# Patient Record
Sex: MALE | Race: WHITE | NOT HISPANIC OR LATINO | Employment: OTHER | URBAN - METROPOLITAN AREA
[De-identification: names, ages, dates, MRNs, and addresses within clinical notes are randomized per-mention and may not be internally consistent; named-entity substitution may affect disease eponyms.]

---

## 2017-03-22 ENCOUNTER — HOSPITAL ENCOUNTER (OUTPATIENT)
Dept: CARDIOLOGY | Facility: HOSPITAL | Age: 82
Discharge: HOME | End: 2017-03-22
Attending: INTERNAL MEDICINE
Payer: MEDICARE

## 2017-03-22 DIAGNOSIS — R00.2 PALPITATIONS: ICD-10-CM

## 2017-03-22 PROCEDURE — 93005 ELECTROCARDIOGRAM TRACING: CPT

## 2018-06-21 ENCOUNTER — HOSPITAL ENCOUNTER (EMERGENCY)
Facility: HOSPITAL | Age: 83
Discharge: HOME | End: 2018-06-21
Attending: EMERGENCY MEDICINE
Payer: MEDICARE

## 2018-06-21 VITALS
TEMPERATURE: 98 F | DIASTOLIC BLOOD PRESSURE: 67 MMHG | OXYGEN SATURATION: 98 % | RESPIRATION RATE: 16 BRPM | HEART RATE: 82 BPM | SYSTOLIC BLOOD PRESSURE: 143 MMHG | WEIGHT: 212 LBS | BODY MASS INDEX: 33.27 KG/M2 | HEIGHT: 67 IN

## 2018-06-21 DIAGNOSIS — S06.0X0S CONCUSSION WITHOUT LOSS OF CONSCIOUSNESS, SEQUELA: ICD-10-CM

## 2018-06-21 DIAGNOSIS — S02.2XXS CLOSED FRACTURE OF NASAL BONE, SEQUELA: Primary | ICD-10-CM
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
## 2018-06-21 DIAGNOSIS — T07.XXXA ABRASIONS OF MULTIPLE SITES: ICD-10-CM

## 2018-06-21 PROCEDURE — 21310 HC CL TX OF NASAL FX WO MAN: CPT

## 2018-06-21 PROCEDURE — 90471 IMMUNIZATION ADMIN: CPT | Performed by: EMERGENCY MEDICINE

## 2018-06-21 PROCEDURE — 25000003 PHARM REV CODE 250: Performed by: EMERGENCY MEDICINE

## 2018-06-21 PROCEDURE — 63600175 PHARM REV CODE 636 W HCPCS: Performed by: EMERGENCY MEDICINE

## 2018-06-21 PROCEDURE — 90715 TDAP VACCINE 7 YRS/> IM: CPT | Performed by: EMERGENCY MEDICINE

## 2018-06-21 PROCEDURE — 99284 EMERGENCY DEPT VISIT MOD MDM: CPT | Mod: 25

## 2018-06-21 RX ADMIN — BACITRACIN, NEOMYCIN, POLYMYXIN B 1 EACH: 400; 3.5; 5 OINTMENT TOPICAL at 12:06

## 2018-06-21 RX ADMIN — CLOSTRIDIUM TETANI TOXOID ANTIGEN (FORMALDEHYDE INACTIVATED), CORYNEBACTERIUM DIPHTHERIAE TOXOID ANTIGEN (FORMALDEHYDE INACTIVATED), BORDETELLA PERTUSSIS TOXOID ANTIGEN (GLUTARALDEHYDE INACTIVATED), BORDETELLA PERTUSSIS FILAMENTOUS HEMAGGLUTININ ANTIGEN (FORMALDEHYDE INACTIVATED), BORDETELLA PERTUSSIS PERTACTIN ANTIGEN, AND BORDETELLA PERTUSSIS FIMBRIAE 2/3 ANTIGEN 0.5 ML: 5; 2; 2.5; 5; 3; 5 INJECTION, SUSPENSION INTRAMUSCULAR at 12:06

## 2018-06-21 NOTE — DISCHARGE INSTRUCTIONS
Please make sure to see your PCP tomorrow for follow-up, NO sports or physical activity until cleared by your PCP, please keep antibiotic creams over all wounds and do not cover, if you develop any worsening pain, or fevers, vomiting, or worsening headaches or neurological deficits, return to the ER immediately for evaluation, please call the ENT we referred you to for assessment of your nasal bone fractures

## 2018-06-21 NOTE — ED PROVIDER NOTES
Encounter Date: 6/21/2018    SCRIBE #1 NOTE: I, Padmini Dang, am scribing for, and in the presence of,  Dr. Setphens. I have scribed the entire note.       History     Chief Complaint   Patient presents with    Fall     Pt reports falling just PTA. Pt was unloading bag from car, fell forward, and hit face on concrete. abrasions noted to face. Hematoma to forehead. Pt denies LOC      HPI     This is a 89 y.o. male with PMHx of skin cancer and HTN who presents to the ED s/p fall today.    The pt states he tripped and fell. He denies LOC or headache. He denies any other pain. Pt reports associated contusions and abrasions to his face, bilat UE, and bilat LE. He reports no exacerbating or alleviating factors.    Pt reports no prior history of similar symptoms. He states he had a CAT scan recently which was benign. He denies a history of DM, MI, or CVA. Unsure of last Tetanus.    Pt confirms this was an entirely mechanical fall as noted while going out onto the road, came here per daughter, and no active bleeding.    No recent hospitalizations.     Review of patient's allergies indicates:  No Known Allergies  Past Medical History:   Diagnosis Date    Abscess of foot     Anemia     Arthritis     Asthma     Gout attack     Hypertension     Necrotizing fasciitis     Senile cataracts of both eyes     Skin cancer of face      Past Surgical History:   Procedure Laterality Date    ANGIOPLASTY      CATARACT EXTRACTION W/  INTRAOCULAR LENS IMPLANT Left 10/05/15    Dr Silverman    CATARACT EXTRACTION W/  INTRAOCULAR LENS IMPLANT Right 11/09/15    Dr Silverman     EYE SURGERY      PTERYGIUM Bilateral [address] [place] [address] total of 12; 2 each eye    SKIN GRAFT      June 23, 2013    TONSILLECTOMY      TONSILLECTOMY, ADENOIDECTOMY       Family History   Problem Relation Age of Onset    Heart disease Mother     Heart disease Father      Social History   Substance Use Topics    Smoking status: Former Smoker    Smokeless  tobacco: Never Used    Alcohol use No     Review of Systems   Review of Systems   Constitutional: Negative for fevers, chills, diaphoresis and fever.   HENT: Negative for congestion, drooling, ear pain, nosebleeds, sore throat and trouble swallowing.    Eyes: Negative for photophobia, pain and discharge.   Respiratory: Negative for chest tightness, shortness of breath and wheezing.    Cardiovascular: Negative for chest pain and palpitations.   Gastrointestinal: Negative for abdominal pain, diarrhea, nausea and vomiting.   Genitourinary: Negative for difficulty urinating, dysuria, flank pain, frequency and hematuria.   Musculoskeletal: See HPI.  Skin: See HPI.  Neurological: Negative for dizziness, seizures, syncope and headaches.   Psychiatric/Behavioral: Negative for behavioral problems.       Physical Exam     Initial Vitals [06/21/18 1047]   BP Pulse Resp Temp SpO2   (!) 143/69 81 18 97.9 °F (36.6 °C) 96 %      MAP       --         Physical Exam   Constitutional: Pt appears well-developed and well-nourished. No distress.   HENT:   Head: Circular contusion on right forehead, abrasion on upper nose, no active bleeding. Circular abrasion on the right axilla and antrium of right nose, abrasion on lower right cheek. No septal hematoma. No dental trauma.  Mouth/Throat: No oropharyngeal exudate.   Eyes: Conjunctivae and EOM are normal. Pupils are equal, round, and reactive to light. No scleral icterus.   Neck: Normal range of motion. Neck supple. No JVD present.   Cardiovascular: Normal rate, regular rhythm and normal heart sounds. Exam reveals no gallop and no friction rub.    No murmur heard.  Pulmonary/Chest: Breath sounds normal. No stridor. No respiratory distress. Pt has no wheezes. Pt has no rhonchi.   Abdominal: Soft. Bowel sounds are normal. Pt exhibits no distension and no mass. There is no tenderness. There is no rebound and no guarding.   Musculoskeletal: Normal range of motion. Pt exhibits no edema or  tenderness. No C-spine T-spine or L-spine tenderness.  Lymphadenopathy:     Pt has no cervical adenopathy.   Neurological: Pt is alert and oriented to person, place, and time. Pt has normal strength and normal reflexes. Pt displays normal reflexes. No cranial nerve deficit or sensory deficit.   Skin: Skin is warm and dry. Capillary refill takes less than 2 seconds. No rash and no abscess noted. Scattered abrasions on left dorsal, middle and ring fingers. Scattered abrasions on his left elbow. Scattered abrasions on bilateral knees.      ED Course   Procedures  Labs Reviewed - No data to display       Imaging Results          CT Head Without Contrast (Final result)  Result time 06/21/18 13:50:22    Final result by Pranav Heller MD (06/21/18 13:50:22)                 Impression:      No detrimental change or acute intracranial abnormality identified when compared to head CT earlier same date.  Specifically, previous question of hyperattenuation overlying the right frontal convexity is not identified, likely having represented artifact.      Electronically signed by: Pranav Heller MD  Date:    06/21/2018  Time:    13:50             Narrative:    EXAMINATION:  CT HEAD WITHOUT CONTRAST    CLINICAL HISTORY:  Headache, acute, norm neuro exam;repeat as prior study shows motion artifact;    TECHNIQUE:  Low dose axial images were obtained through the head.  Coronal and sagittal reformations were also performed. Contrast was not administered.    COMPARISON:  Head CT earlier same day    FINDINGS:  No detrimental change.  Soft tissue swelling/contusion again overlying the midline and right paramedian inferior frontal calvarium extending to the paranasal soft tissues with bilateral comminuted nasal bone fractures and fracture of the nasal septum, better demonstrated on maxillofacial CT earlier same day.    Age-appropriate generalized cerebral volume loss with compensatory enlargement of the ventricles, sulci and cisterns,  without hydrocephalus.  No midline shift or mass effect.  Grossly stable distribution of periventricular and subcortical white matter low-attenuation changes likely sequela of chronic small vessel ischemic disease.  Small area of encephalomalacia at the right parieto-occipital watershed zone, unchanged.  No extra-axial hemorrhage or mass.  Suspected remote lacunar type infarct at the left thalamus and tiny remote infarct at the left cerebellar hemisphere.  No new focal areas of abnormal parenchymal attenuation.  No acute large vascular territory infarct or intracranial hemorrhage.  No displaced skull fracture.  Visualized paranasal sinuses are clear with partial opacification of the right mastoid air cells.  Prior surgery to the bilateral ocular lenses.                               X-Ray Chest 1 View (Final result)  Result time 06/21/18 12:32:44    Final result by Gato Ann MD (06/21/18 12:32:44)                 Impression:      No acute process.  No interval worsening.      Electronically signed by: Gato Ann MD  Date:    06/21/2018  Time:    12:32             Narrative:    EXAMINATION:  XR CHEST 1 VIEW    CLINICAL HISTORY:  Unspecified fall, initial encounter    TECHNIQUE:  Single frontal view of the chest was performed.    COMPARISON:  12/01/2014.    FINDINGS:  Trachea is patent.  Cardiac silhouette appears mildly prominent.  There is atherosclerosis.  Lungs are well expanded without focal consolidation, mass, effusion, pneumothorax or free air below the diaphragm.  There is no acute osseous abnormality.                               X-Ray Pelvis Routine AP (Final result)  Result time 06/21/18 12:31:56    Final result by Gato Ann MD (06/21/18 12:31:56)                 Impression:      No definite evidence for fracture on this single frontal view of the chest.      Electronically signed by: Gato Ann MD  Date:    06/21/2018  Time:    12:31             Narrative:    EXAMINATION:  XR PELVIS  ROUTINE AP    CLINICAL HISTORY:  fall;    TECHNIQUE:  AP view of the pelvis was performed.    COMPARISON:  None.    FINDINGS:  No evidence for acute fracture, dislocation or destructive process.  Please note that evaluation is limited due to frontal projection only available.  The patient is slightly rotated.  There is joint space narrowing.  Surrounding soft tissues appear unremarkable.  Degenerative changes of the lower lumbar levels noted.                               CT Cervical Spine Without Contrast (Final result)  Result time 06/21/18 12:43:21    Final result by Gato Ann MD (06/21/18 12:43:21)                 Impression:      No evidence for acute fracture, dislocation or destructive process.    Advanced degenerative changes as described above.    Medial deviation of the right vocal cord suggestive of right vocal cord paralysis.  Further evaluation is advised.    See body of report for details and other incidental findings.      Electronically signed by: Gato Ann MD  Date:    06/21/2018  Time:    12:43             Narrative:    EXAMINATION:  CT CERVICAL SPINE WITHOUT CONTRAST    CLINICAL HISTORY:  Neck pain, first study;    TECHNIQUE:  Low dose axial images, sagittal and coronal reformations were performed though the cervical spine.  Contrast was not administered.    COMPARISON:  Maxillofacial CT of the same day.    FINDINGS:  There is no evidence for acute fracture, dislocation or osseous destructive process.  There is discontinue it of the anterior arch of C1 on the left with excessive bony formation which may be related to sequela from remote injury/trauma given that the margins are sclerotic.  Additionally, the appearance of this is unchanged from the maxillofacial CT from earlier today and there are no prevertebral soft tissue edema.  The dense appears maintained.  There small subluxation of C2 on C3.  Vertebral body heights are maintained.  There is diffuse severe disc space narrowing with  multiple levels of disc osteophyte formation.  The vertebral body heights appear maintained.  There is mild reversal of the normal cervical lordosis.  Facet arthrosis noted but no evidence for facet dislocation.  Spinous processes appear unremarkable.  No prevertebral soft tissue swelling.  There is no evidence for fracture involving the transverse foramina.  No definite evidence for epidural collection.  There is extensive pannus posterior to the dens which resulting compression at this level.    C2-C3 demonstrates moderate bilateral neural foraminal narrowing from uncovertebral spurring and facet arthrosis and possibly with mild spinal canal stenosis.    C3-C4 demonstrates disc osteophyte complex and facet arthrosis on the left.  There is no significant neural foraminal narrowing.  There is a disc osteophyte complex which results in moderate spinal canal stenosis.    C4-C5 demonstrates uncovertebral spurring bilaterally and facet arthrosis on the left which results in mild left neural foraminal narrowing.  There is a disc osteophyte complex which results in moderate spinal canal stenosis.    C5-C6 demonstrates bilateral uncovertebral spurring, worse on the right resulting in mild left and moderate right neural foraminal narrowing.  There is a prominent disc osteophyte complex resulting in severe spinal canal stenosis.    C6-C7 demonstrates severe disc osteophyte complex and uncovertebral spurring resulting in severe bilateral neural foraminal narrowing and canal stenosis.    C7-T1 demonstrates bilateral neural foraminal narrowing from uncovertebral spurring and facet arthrosis.  Cannot cannot be optimally evaluated at this level.    The posterior spinal musculature appears unremarkable.  For details regarding the intracranial contents, refer to head CT report of the same day.  Thyroid gland appears unremarkable.  There is medial deviation of the right vocal cord.  Consider further evaluation.  No lymph node  enlargement within the neck.  There is mild atherosclerosis.  The visualized lung apices appear clear.  The  images demonstrate no acute finding.                               CT Maxillofacial Without Contrast (Final result)  Result time 06/21/18 12:10:51    Final result by Reese Mendoza DO (06/21/18 12:10:51)                 Impression:      Soft tissue swelling induration overlies the nasal bones and inferior frontal calvarium comminuted fracture deformities of the nasal bones and anterior bony nasal septum.    Please note fractures are slightly deviated toward the left with superimposed rightward deviated nasal septum.    No additional fracture of the remaining maxillofacial bones.      Electronically signed by: Reese Mendoza DO  Date:    06/21/2018  Time:    12:10             Narrative:    EXAMINATION:  CT MAXILLOFACIAL WITHOUT CONTRAST    CLINICAL HISTORY:  Maxface trauma blunt;    TECHNIQUE:  Multiple sequential 2.5 mm axial images of the maxillofacial bones without contrast.  Coronal and sagittal reformatted imaging from the axial acquisition.    COMPARISON:  None    FINDINGS:  Soft tissue swelling induration overlying the inferior frontal calvarium extending to the nasal soft tissues with underlying comminuted bilateral nasal bone fractures.  In addition there is fracture deformity of the anterior bony nasal septum which is deviated to the left.    There is superimposed rightward deviation of the nasal septum.    Bony orbits are intact bilaterally.    There is mild mucosal thickening in the maxillary antra bilaterally.  No evidence for mandibular fracture or dislocation.  Degenerative change in the visualized cervical spine    Dental gaby associated with residual single right maxillary tooth                               CT Head Without Contrast (Final result)  Result time 06/21/18 12:08:05    Final result by Reese Mendoza DO (06/21/18 12:08:05)                 Impression:      Study slightly  limited by patient motion.  There is trace extra-axial hyperdensity overlying the right inferior frontal convexity cannot exclude trace acute subdural hemorrhage.  Clinical correlation and follow-up advised.    Age-appropriate cerebral volume loss with mild moderate patchy decreased attenuation supratentorial white matter while nonspecific suggestive for chronic ischemic change.    Fracture deformities of the nasal bones please see CT maxillofacial bones for further details.      Electronically signed by: Reese Mendoza DO  Date:    06/21/2018  Time:    12:08             Narrative:    EXAMINATION:  CT HEAD WITHOUT CONTRAST    CLINICAL HISTORY:  Headache, acute, norm neuro exam;    TECHNIQUE:  Multiple sequential 5 mm axial images of the head without contrast.  Coronal and sagittal reformatted imaging from the axial acquisition.    COMPARISON:  None    FINDINGS:  Subcutaneous induration and swelling overlying the inferior frontal calvarium extending to the paranasal soft tissues with bilateral comminuted nasal bone fractures.  There is additional fracture of the nasal septum.  Please see CT maxillofacial bone report for further details.  There is age-appropriate generalized cerebral volume loss.  Compensatory enlargement of the ventricle sulci and cisterns without hydrocephalus.  There is no midline shift or mass effect.  There is mild moderate ill-defined decreased attenuation in the supratentorial white matter while nonspecific suggestive for chronic ischemic change.  There is no evidence for acute intracranial hemorrhage or sulcal effacement.  There is no midline shift or mass effect.  Subtle extra-axial hyperdensity along the right inferior frontal convexity concerning for small volume acute subdural hemorrhage or dural calcification...  Visualized paranasal sinuses are clear with partial opacification of the right mastoid air cells.                                                      Clinical Impression:   The  primary encounter diagnosis was Closed fracture of nasal bone, sequela. Diagnoses of Fall, Concussion without loss of consciousness, sequela, and Abrasions of multiple sites were also pertinent to this visit.    MD NOTE, 11:31am: Pt stable nad vss, pt presents as noted, CT H C spine MF, also CXR and Pelvis XR, TDAP, pain control, and likely DC, pt remains HD stable        MD NOTE, 2:18pm: Pt stable nad vss, pt presents as noted, CTH shows no ICH, it was repeated as there was motion artifact, remaining CT unimpressive barring nasal bone fractures, we have cleaned all wounds, covered with proper dressings, given concussion precautions, pt is in NAD, remaining examination unimpressive and non-focal neurological exam, pt is resting comfortably, XR Chest and Pelvis unimpressive, pt stable on DC, advisd must get concussion screening with PCP prior to sports/physical activity, otherwise ENT f/u and educated on wound care, I also told them:    Please make sure to see your PCP tomorrow for follow-up, NO sports or physical activity until cleared by your PCP, please keep antibiotic creams over all wounds and do not cover, if you develop any worsening pain, or fevers, vomiting, or worsening headaches or neurological deficits, return to the ER immediately for evaluation, please call the ENT we referred you to for assessment of your nasal bone fractures                     Melyssa Stephens MD  06/21/18 1141

## 2018-06-22 ENCOUNTER — TELEPHONE (OUTPATIENT)
Dept: OTOLARYNGOLOGY | Facility: CLINIC | Age: 83
End: 2018-06-22

## 2018-06-22 NOTE — TELEPHONE ENCOUNTER
----- Message from Lorna Koenig sent at 6/22/2018  8:33 AM CDT -----  Contact: [phone]  Pt its requesting an appointment for today or Monday , states he had a fall and fracture his nose . Please advise

## 2018-06-22 NOTE — TELEPHONE ENCOUNTER
Spoke to patients daughter, she stated the patient fell yesterday and broke his nose, they went to the ED at [facility] and need a follow up appointment with Dr. Horner. The patient is scheduled for next Thursday at 2 pm.

## 2018-06-28 ENCOUNTER — TELEPHONE (OUTPATIENT)
Dept: PRIMARY CARE CLINIC | Facility: CLINIC | Age: 83
End: 2018-06-28

## 2018-06-28 NOTE — TELEPHONE ENCOUNTER
Spoke to patients wife, patient is seeing pcp next week and she will call back to make an appointment if pcp feels it is necessary. States the patients fracture is healing beautifully.

## 2019-02-21 ENCOUNTER — CLINICAL SUPPORT (OUTPATIENT)
Dept: LAB | Facility: HOSPITAL | Age: 84
End: 2019-02-21
Attending: INTERNAL MEDICINE
Payer: MEDICARE

## 2019-02-21 DIAGNOSIS — Z01.818 PREOP EXAMINATION: Primary | ICD-10-CM

## 2019-02-21 DIAGNOSIS — Z01.818 PREOP EXAMINATION: ICD-10-CM

## 2019-02-21 DIAGNOSIS — R09.89 BRUIT: Primary | ICD-10-CM

## 2019-02-21 PROCEDURE — 93005 ELECTROCARDIOGRAM TRACING: CPT

## 2019-06-13 ENCOUNTER — HOSPITAL ENCOUNTER (OUTPATIENT)
Dept: RADIOLOGY | Facility: HOSPITAL | Age: 84
Discharge: HOME | End: 2019-06-13
Attending: INTERNAL MEDICINE
Payer: MEDICARE

## 2019-06-13 DIAGNOSIS — R09.89 BRUIT: ICD-10-CM

## 2019-06-13 PROCEDURE — 93880 EXTRACRANIAL BILAT STUDY: CPT | Mod: TC

## 2019-06-13 PROCEDURE — 93880 US CAROTID BILATERAL: ICD-10-PCS | Mod: 26,,, | Performed by: RADIOLOGY

## 2019-06-13 PROCEDURE — 93880 EXTRACRANIAL BILAT STUDY: CPT | Mod: 26,,, | Performed by: RADIOLOGY

## 2019-10-29 RX ORDER — MELOXICAM 15 MG/1
TABLET ORAL
Qty: 90 TABLET | Refills: 0 | OUTPATIENT
Start: 2019-10-29

## 2019-10-29 RX ORDER — AMLODIPINE BESYLATE 10 MG/1
TABLET ORAL
Qty: 90 TABLET | Refills: 1 | OUTPATIENT
Start: 2019-10-29

## 2019-10-29 RX ORDER — TAMSULOSIN HYDROCHLORIDE 0.4 MG/1
CAPSULE ORAL
Qty: 90 CAPSULE | Refills: 1 | OUTPATIENT
Start: 2019-10-29